# Patient Record
Sex: MALE | Race: WHITE | NOT HISPANIC OR LATINO | Employment: OTHER | URBAN - METROPOLITAN AREA
[De-identification: names, ages, dates, MRNs, and addresses within clinical notes are randomized per-mention and may not be internally consistent; named-entity substitution may affect disease eponyms.]

---

## 2017-01-23 ENCOUNTER — GENERIC CONVERSION - ENCOUNTER (OUTPATIENT)
Dept: OTHER | Facility: OTHER | Age: 77
End: 2017-01-23

## 2017-01-25 ENCOUNTER — ALLSCRIPTS OFFICE VISIT (OUTPATIENT)
Dept: OTHER | Facility: OTHER | Age: 77
End: 2017-01-25

## 2017-01-25 DIAGNOSIS — R94.4 ABNORMAL RESULTS OF KIDNEY FUNCTION STUDIES: ICD-10-CM

## 2017-01-25 DIAGNOSIS — I48.91 ATRIAL FIBRILLATION (HCC): ICD-10-CM

## 2017-02-16 ENCOUNTER — ALLSCRIPTS OFFICE VISIT (OUTPATIENT)
Dept: OTHER | Facility: OTHER | Age: 77
End: 2017-02-16

## 2017-03-15 DIAGNOSIS — I48.91 ATRIAL FIBRILLATION (HCC): ICD-10-CM

## 2017-03-15 DIAGNOSIS — E11.9 TYPE 2 DIABETES MELLITUS WITHOUT COMPLICATIONS (HCC): ICD-10-CM

## 2017-03-28 ENCOUNTER — ALLSCRIPTS OFFICE VISIT (OUTPATIENT)
Dept: OTHER | Facility: OTHER | Age: 77
End: 2017-03-28

## 2017-04-12 DIAGNOSIS — R94.4 ABNORMAL RESULTS OF KIDNEY FUNCTION STUDIES: ICD-10-CM

## 2017-04-12 DIAGNOSIS — N18.30 CHRONIC KIDNEY DISEASE, STAGE III (MODERATE) (HCC): ICD-10-CM

## 2017-04-17 ENCOUNTER — HOSPITAL ENCOUNTER (OUTPATIENT)
Dept: NON INVASIVE DIAGNOSTICS | Facility: CLINIC | Age: 77
Discharge: HOME | End: 2017-04-17
Payer: COMMERCIAL

## 2017-04-17 DIAGNOSIS — E11.9 TYPE 2 DIABETES MELLITUS WITHOUT COMPLICATIONS (HCC): ICD-10-CM

## 2017-04-17 DIAGNOSIS — I48.91 ATRIAL FIBRILLATION (HCC): ICD-10-CM

## 2017-04-17 PROCEDURE — 93350 STRESS TTE ONLY: CPT

## 2017-04-18 LAB
ARRHY DURING EX: NORMAL
CHEST PAIN STATEMENT: NORMAL
MAX DIASTOLIC BP: 88 MMHG
MAX HEART RATE: 131 BPM
MAX PREDICTED HEART RATE: 146 BPM
MAX. SYSTOLIC BP: 216 MMHG
PROTOCOL NAME: NORMAL
REASON FOR TERMINATION: NORMAL
TARGET HR FORMULA: NORMAL
TEST INDICATION: NORMAL
TIME IN EXERCISE PHASE: 175 S

## 2017-04-25 ENCOUNTER — ALLSCRIPTS OFFICE VISIT (OUTPATIENT)
Dept: OTHER | Facility: OTHER | Age: 77
End: 2017-04-25

## 2017-04-26 ENCOUNTER — GENERIC CONVERSION - ENCOUNTER (OUTPATIENT)
Dept: OTHER | Facility: OTHER | Age: 77
End: 2017-04-26

## 2017-04-27 ENCOUNTER — ALLSCRIPTS OFFICE VISIT (OUTPATIENT)
Dept: OTHER | Facility: OTHER | Age: 77
End: 2017-04-27

## 2017-06-15 DIAGNOSIS — E11.9 TYPE 2 DIABETES MELLITUS WITHOUT COMPLICATIONS (HCC): ICD-10-CM

## 2017-06-15 DIAGNOSIS — R60.9 EDEMA: ICD-10-CM

## 2017-06-15 DIAGNOSIS — E78.5 HYPERLIPIDEMIA: ICD-10-CM

## 2017-06-15 DIAGNOSIS — N18.30 CHRONIC KIDNEY DISEASE, STAGE III (MODERATE) (HCC): ICD-10-CM

## 2017-06-15 DIAGNOSIS — I48.91 ATRIAL FIBRILLATION (HCC): ICD-10-CM

## 2017-06-16 ENCOUNTER — TRANSCRIBE ORDERS (OUTPATIENT)
Dept: SLEEP CENTER | Facility: CLINIC | Age: 77
End: 2017-06-16

## 2017-06-16 ENCOUNTER — HOSPITAL ENCOUNTER (OUTPATIENT)
Dept: SLEEP CENTER | Facility: CLINIC | Age: 77
Discharge: HOME | End: 2017-06-16
Payer: COMMERCIAL

## 2017-06-16 DIAGNOSIS — G47.33 OSA AND COPD OVERLAP SYNDROME (HCC): Primary | ICD-10-CM

## 2017-06-16 DIAGNOSIS — J44.9 OSA AND COPD OVERLAP SYNDROME (HCC): Primary | ICD-10-CM

## 2017-06-16 DIAGNOSIS — G47.33 OSA (OBSTRUCTIVE SLEEP APNEA): ICD-10-CM

## 2017-06-19 ENCOUNTER — GENERIC CONVERSION - ENCOUNTER (OUTPATIENT)
Dept: OTHER | Facility: OTHER | Age: 77
End: 2017-06-19

## 2017-07-13 ENCOUNTER — GENERIC CONVERSION - ENCOUNTER (OUTPATIENT)
Dept: OTHER | Facility: OTHER | Age: 77
End: 2017-07-13

## 2017-07-26 DIAGNOSIS — Z79.01 LONG TERM CURRENT USE OF ANTICOAGULANT: ICD-10-CM

## 2017-07-26 DIAGNOSIS — I48.91 ATRIAL FIBRILLATION (HCC): ICD-10-CM

## 2017-07-28 ENCOUNTER — GENERIC CONVERSION - ENCOUNTER (OUTPATIENT)
Dept: OTHER | Facility: OTHER | Age: 77
End: 2017-07-28

## 2017-07-31 ENCOUNTER — GENERIC CONVERSION - ENCOUNTER (OUTPATIENT)
Dept: OTHER | Facility: OTHER | Age: 77
End: 2017-07-31

## 2017-08-10 ENCOUNTER — GENERIC CONVERSION - ENCOUNTER (OUTPATIENT)
Dept: OTHER | Facility: OTHER | Age: 77
End: 2017-08-10

## 2017-08-14 ENCOUNTER — GENERIC CONVERSION - ENCOUNTER (OUTPATIENT)
Dept: OTHER | Facility: OTHER | Age: 77
End: 2017-08-14

## 2017-08-29 ENCOUNTER — ALLSCRIPTS OFFICE VISIT (OUTPATIENT)
Dept: OTHER | Facility: OTHER | Age: 77
End: 2017-08-29

## 2017-08-30 ENCOUNTER — GENERIC CONVERSION - ENCOUNTER (OUTPATIENT)
Dept: OTHER | Facility: OTHER | Age: 77
End: 2017-08-30

## 2017-09-08 ENCOUNTER — GENERIC CONVERSION - ENCOUNTER (OUTPATIENT)
Dept: OTHER | Facility: OTHER | Age: 77
End: 2017-09-08

## 2017-09-26 ENCOUNTER — GENERIC CONVERSION - ENCOUNTER (OUTPATIENT)
Dept: OTHER | Facility: OTHER | Age: 77
End: 2017-09-26

## 2017-10-02 ENCOUNTER — GENERIC CONVERSION - ENCOUNTER (OUTPATIENT)
Dept: OTHER | Facility: OTHER | Age: 77
End: 2017-10-02

## 2017-10-03 ENCOUNTER — GENERIC CONVERSION - ENCOUNTER (OUTPATIENT)
Dept: OTHER | Facility: OTHER | Age: 77
End: 2017-10-03

## 2017-10-24 NOTE — PROGRESS NOTES
Assessment  Assessed    1  Atrial fibrillation (427 31) (I48 91)   2  Anticoagulated (V58 61) (Z79 01)   3  Hyperlipidemia (272 4) (E78 5)   4  Abnormal results of kidney function studies (794 4) (R94 4)    Plan  Unlinked    · Garlic TABS   Dispense: 0 Days ; #:0 Tablet; Refill: 0; SUKI = N; Record; Last Updated By: Alexandra Ramirez; 8/29/2017 10:42:09 AM   · Vitamin C TABS   Dispense: 0 Days ; #:0 Tablet; Refill: 0; SUKI = N; Record; Last Updated By: Alexandra Ramirez; 8/29/2017 10:42:15 AM   · Vitamin D CAPS   Dispense: 0 Days ; #:0 Capsule; Refill: 0; SUKI = N; Record; Last Updated By: Alexandra Ramirez; 8/29/2017 10:42:18 AM    Discussion/Summary  Cardiology Discussion Summary Free Text Note Form [facility]:   Patient is cardiovascularly stable  No CHF, angina or symptomatic ectopy   no bleeding on Coumadin- rate controlled on beta blockers-  with diagnosis of multiple myeloma-and liposarcoma-scheduled for multiple oncologic interventions including abdominal surgery  has normal left ventricular contractile function no serious valve disease no history of angina and no provokable ischemia on stress testing--- no cardiac contraindications to surgery which is necessary to attempt successful treatment of his cancer--patient should be monitored closely intraoperatively and perioperatively with reinitiation of anticoagulation as soon as patient is stable postoperatively--his Coumadin should be held 5 days prior to surgery-with checking of pro times preoperatively----patient has been advised to guarded prognosis with his oncologic situation complicated by renal failure  has elected to pursue his upcoming surgery treatment at [address] [place] [address] and has chosen to obtain his cardiology care at [facility] would make an appointment to see a cardiologist Dr Hubert Mandel follow his cardiology problems in the future--copies given of prior testing  is asymptomatic from a cardiac perspective at this time is overall prognosis appears guarded with his multisystem diseases-all questions answered  Chief Complaint  Chief Complaint Free Text Note Form: Patient here for follow-up cardiac evaluation--history of atrial fibrillation- accompanied by his daughter-recently hospitalized at [facility] and diagnosed with myeloma and liposarcoma of the abdomen      History of Present Illness  Cardiology HPI Free Text Note Form [facility]: Patient is seen for followup cardiac evaluation --- patient relates that in July aree2017-patient underwent a colonoscopy-which was followed by symptoms of severe fatigue workup by PCP demonstrated no renal failure and coagulopathy--this prompted [facility] admission in mid July 2017 with diagnosis of multiple myeloma as well as 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--patient was referred to [facility] for stem cell transplant is scheduled for radiation therapy--and is scheduled for surgery by [facility] for surgery of his liposarcoma-patient is also being followed by Dr Stefanie Brown  complaints of chest pains or shortness of breath no palpitations CVA TIA amaurosis patient was discharged on aspirin as well as Coumadin by his oncologist--patient was given sodium bicarbonate on discharge    Was seen in [facility] for afib August 2015-- was scheduled for colonoscopy by Dr Bill Plaza- and was noted to have atrial fibrillation with controlled rate- patient was asymptomatic no history of thromboembolism- was treated with anticoagulation with no problems  has history of diabetes --followed by PCP Dr Jaquelin Garcia ----no CHF has venous disease no major peripheral edema      an echo stress test done here in the office in April 2017-no diagnostic ischemia or regional wall motion abnormalities identified--- past echo with good LV function no serious valve disease--- stress testing 2017-able to complete 5 and half minutes of the Reinier protocol attaining a maximal heart rate of 190 with no angina no ischemic ST segment depressions--nuclear stress testing in 2015 with no provokable ischemia  to July 2017 the patient was active physically with no impairment of exercise ability  No angina, CHF, palpitations, syncope, seizures, CVA/TIA, dizziness, lightheadedness, amaurosis, orthostasis, myositis or edema  No urinary or bowel complaints  No rashes, fevers, arrhythmic symptoms, or thromboembolic symptoms  No PND  He was also diagnosed with sleep apnea treated with CPAP    9/4/2015- ef 60%scan 9/2015-, negative for ischemia  been good in the past following closely with Dr Little Clements- monitoring sugars renal function--- 2016 lab tests showed sugar of 110 normal renal function good cholesterol LDLhistory of MI, cardiomyopathy thromboembolism peripheral vessel disease cerebrovascular diseaseheavy smokingof obesitydrug abuse alcohol abuse no recent smokingdrives a school buspremature family CAD  for many years  tests from August 10 of 2017 with creatinine of 3 1 albumin 2 6 hematocrit 27 white count 8 6from July 13 of 2017 A  fib rate of 78      Review of Systems  Cardiology Male ROS:     Cardiac: No complaints of chest pain, no palpitations, no fainiting -- and-- as noted in HPI  Skin: No complaints of nonhealing sores or skin rash  Genitourinary: no frequent urination at night   Psychological: No complaints of feeling depressed, anxiety, panic attacks, or difficulty concentrating  General: lack of energy/fatigue  Respiratory: No complaints of shortness of breath, cough with sputum, or wheezing -- and-- as noted in HPI  HEENT: No complaints of serious problems, hearing problems, nose problems, throat problems, or snoring  Gastrointestinal: Occasional nausea indigestion  Hematologic: No complaints of bleeding disorders, anemia, blood clots, or excessive brusing     Neurological: No complaints of numbness, tingling, dizziness, weakness, seizures, headaches, syncope or fainting, AM fatigue, daytime sleepiness, no witnessed apnea episodes  Musculoskeletal: arthritis      Active Problems  Problems    1  Abdominal pain (789 00) (R10 9)   2  Abnormal results of kidney function studies (794 4) (R94 4)   3  Acute kidney injury (584 9) (N17 9)   4  Anticoagulated (V58 61) (Z79 01)   5  Atrial fibrillation (427 31) (I48 91)   6  Chronic kidney disease, stage 3 (585 3) (N18 3)   7  DM2 (diabetes mellitus, type 2) (250 00) (E11 9)   8  Edema (782 3) (R60 9)   9  Encounter for physical examination related to employment (V70 5) (Z02 1)   10  Hoarseness or changing voice (784 49) (R49 9)   11  Hyperlipidemia (272 4) (E78 5)   12  Hypertension (401 9) (I10)   13  Impacted cerumen of both ears (380 4) (H61 23)   14  Intermittent right upper quadrant abdominal pain (789 01) (R10 11)   15  Medicare annual wellness visit, subsequent (V70 0) (Z00 00)   16  Need for influenza vaccination (V04 81) (Z23)   17  Need for pneumococcal vaccination (V03 82) (Z23)   18  Negative depression screening   19  Obesity (278 00) (E66 9)   20  Screening for colon cancer (V76 51) (Z12 11)   21  Screening for diabetic retinopathy (V80 2) (Z13 5)   22  Screening for skin condition (V82 0) (Z13 89)   23  Seborrheic keratosis (702 19) (L82 1)   24  Shortness of breath (786 05) (R06 02)   25  Sleep apnea, unspecified (780 57) (G47 30)    Past Medical History  Problems    1  H/O nonmelanoma skin cancer (V10 83) (S55 534)   2  History of Umbilical hernia (540 2) (K42 9)  Active Problems And Past Medical History Reviewed: The active problems and past medical history were reviewed and updated today  Surgical History  Problems    1  History of Excision Of Lesion Trunk Malignant   2  History of Shaving Of Lesion Trunk  Surgical History Reviewed: The surgical history was reviewed and updated today  Family History  Mother    1  Denied: Family history of substance abuse   2  Denied: Family history of Mental problems  Father    3   Denied: Family history of substance abuse   4  Denied: Family history of Mental problems  Family History Reviewed: The family history was reviewed and updated today  Social History  Problems    · Denied: Alcohol Use (History)   · Caffeine Use   · Former smoker (F67 36) (E97 202)  Social History Reviewed: The social history was reviewed and updated today  The social history was reviewed and is unchanged  Current Meds   1  Aspir-81 81 MG Oral Tablet Delayed Release; Take 1 tablet daily Recorded   2  Atenolol 25 MG Oral Tablet; take 1 tablet by mouth once daily; Therapy: 32Kqj2161 to (Evaluate:02Sep2017)  Requested for: 75Dro3225; Last   Rx:07Sep2016 Ordered   3  Atorvastatin Calcium 10 MG Oral Tablet; take 1/2 tablet by mouth once daily Recorded   4  [employer] Fish Oil 1200 MG Oral Capsule; take 4 qd; Last Rx:17Eei2005 Ordered   5  Garlic TABS; Take 1 tablet daily; Therapy: (Recorded:28Mar2017) to Recorded   6  GlipiZIDE 5 MG Oral Tablet; TAKE ONE TABLET BY MOUTH EVERY DAY AS DIRECTED; Therapy: 54YHW4811 to (Evaluate:19Sep2017)  Requested for: 21Jul2017; Last   Rx:85Xqf5849 Ordered   7  Multi-Vitamins TABS; Take 1 tablet daily Recorded   8  Sodium Bicarbonate 650 MG Oral Tablet; TAKE 1 TABLET TWICE DAILY WITH MEALS   Recorded   9  Vitamin C TABS; TAKE 1 TABLET DAILY; Therapy: (Recorded:28Mar2017) to Recorded   10  Vitamin D CAPS; take 1 capsule daily; Therapy: (Recorded:28Mar2017) to Recorded   11  Warfarin Sodium 5 MG Oral Tablet; TAKE 1 TO 2 TABLETS BY MOUTH EVERY DAY AS    DIRECTED; Therapy: 21Jun2016 to (Last Rx:24Oct2016)  Requested for: 24Oct2016 Ordered   12  Warfarin Sodium 7 5 MG Oral Tablet; TAKE 1/2 TABLET DAILY; Therapy: 34Jes2681 to (96 474879)  Requested for: 10Sml6804; Last    Rx:38Hjp8584 Ordered  Medication List Reviewed: The medication list was reviewed and updated today  Allergies  Medication    1  No Known Drug Allergies  Non-Medication    2   Animal dander - Horses    Vitals  Vital Signs    Recorded: 94Mmy8989 10:46AM   Heart Rate 87   Systolic 945   Diastolic 62   Height 5 ft 9 in   Weight 237 lb 2 08 oz   BMI Calculated 35 02   BSA Calculated 2 22     Physical Exam    Constitutional   General appearance: Abnormal  -- Obese male in no distress  Eyes   Conjunctiva and Sclera examination: Conjunctiva pink, sclera anicteric  Ears, Nose, Mouth, and Throat - External inspection of ears and nose: Normal without deformities or discharge  -- Nasal mucosa, septum, and turbinates: Normal, no edema or discharge  -- Oropharynx: Clear, nares are clear, mucous membranes are moist    Neck   Neck and thyroid: Normal, supple, trachea midline, no thyromegaly  Pulmonary   Respiratory effort: No increased work of breathing or signs of respiratory distress  Auscultation of lungs: Clear to auscultation, no rales, no rhonchi, no wheezing, good air movement  Cardiovascular   Palpation of heart: Normal PMI, no thrills  Auscultation of heart: Abnormal  -- PMI normal S1-S2 normal soft WILLIE left lower sternal border and apex irregularly irregular rhythm noted  Carotid pulses: Normal, 2+ bilaterally  Femoral pulses: Normal, 2+ bilaterally  No abdominal aorta pulsations  Pedal pulses: Normal, 2+ bilaterally  Examination of extremities for edema and/or varicosities: Abnormal  -- trace edema varicose veins stasis dermatitis  Chest - Chest: Normal    Abdomen No rebound tenderness ecchymosis positive bowel sounds no hepatomegaly  Musculoskeletal Gait and station: Normal gait  -- Digits and nails: Normal without clubbing or cyanosis  Neurologic - Speech: Normal     Psychiatric - Orientation to person, place, and time: Normal -- Mood and affect: Normal       Health Management  DM2 (diabetes mellitus, type 2)   (1) HEMOGLOBIN A1C; every 6 months; Last 17THQ7263; Next Due: 87Uwc3005; Overdue  (1) MICROALBUMIN CREATININE RATIO, RANDOM URINE; every 1 year; Last 25TFL1827;  Next Due:  40Cdz1695; Overdue  *VB - Foot Exam; every 1 year; Last 70SHB4106; Next Due: 26ENR7573; Near Due  Screening for colon cancer   COLONOSCOPY; every 10 years; Last 36WNG2230; Next Due: 86Mcd1822; Active  Screening for diabetic retinopathy   *VB - Eye Exam; every 1 year; Last 07XRD8612; Next Due: 63IKZ9308; Near Due  Health Maintenance   Medicare Annual Wellness Visit; every 1 year; Next Due: 36Woe2923;  Overdue    Future Appointments    Date/Time Provider Specialty Site   10/24/2017 11:00 AM Zach Mills MD [address]   11/02/2017 10:40 AM Zach Mills MD [address]     Signatures   Electronically signed by : MARLEN Daugherty ; Aug 29 2017  5:16PM EST                       (Author)

## 2017-10-26 DIAGNOSIS — E78.5 HYPERLIPIDEMIA: ICD-10-CM

## 2017-10-26 DIAGNOSIS — N18.30 CHRONIC KIDNEY DISEASE, STAGE III (MODERATE) (HCC): ICD-10-CM

## 2017-10-26 DIAGNOSIS — E11.9 TYPE 2 DIABETES MELLITUS WITHOUT COMPLICATIONS (HCC): ICD-10-CM

## 2017-10-26 DIAGNOSIS — Z00.00 ENCOUNTER FOR GENERAL ADULT MEDICAL EXAMINATION WITHOUT ABNORMAL FINDINGS: ICD-10-CM

## 2018-01-09 NOTE — RESULT NOTES
Message  Hemoglobin A1c is good at 6 5, continue current diet and meds      Signatures   Electronically signed by : MARLEN Dunne ; Apr 5 2016 12:01PM EST                       (Author)

## 2018-01-09 NOTE — MISCELLANEOUS
Message  Left message on patient's answering machine-advising them that his INR was 2 2 within the therapeutic range  --- As per our previous discussions the patient has elected to transfer his care to Dr cisneros [facility] where he anticipates undergoing follow-up treatments chemotherapy surgeries etc -And his desire to have his complete care done by physicians at that institution for continuity of care and to optimize suggestions for anticoagulation due to ongoing active interventions and problems------ records were sent to Dr Gladys Walter in September 2017  Patient's hospitalist Dr Aleksander Patel -did correspond with me at the time of the recent hospital discharge of the patient shortly ago-and advise me that he was making arrangements for cardiology and medical follow-up with the [facility] team and that was going to be effectuated  It was decided by the patient and myself at be more optimal to have one team managing this patient's anticoagulation status in light of his coexisting oncologic issues and need for multiple interventions        Signatures   Electronically signed by : MARLEN Luna ; Oct  3 2017 12:51PM EST                       (Author)

## 2018-01-12 VITALS
SYSTOLIC BLOOD PRESSURE: 132 MMHG | WEIGHT: 273.38 LBS | HEART RATE: 76 BPM | BODY MASS INDEX: 40.49 KG/M2 | DIASTOLIC BLOOD PRESSURE: 76 MMHG | HEIGHT: 69 IN

## 2018-01-12 VITALS
HEIGHT: 69 IN | HEART RATE: 68 BPM | SYSTOLIC BLOOD PRESSURE: 142 MMHG | WEIGHT: 277.38 LBS | BODY MASS INDEX: 41.08 KG/M2 | DIASTOLIC BLOOD PRESSURE: 78 MMHG

## 2018-01-12 NOTE — PROGRESS NOTES
Assessment    1  Medicare annual wellness visit, subsequent (V70 0) (Z00 00)    Plan  DM2 (diabetes mellitus, type 2)    · (1) COMPREHENSIVE METABOLIC PANEL; Status:Active; Requested NVL:84JQK5756;    · (1) HEMOGLOBIN A1C; Status:Active; Requested TRF:[id number];    · (1) MICROALBUMIN CREATININE RATIO, RANDOM URINE; Status:Active; Requested  UDF:[id number];   Hyperlipidemia    · (1) LIPID PANEL, FASTING; Status:Active; Requested PALOMA:88MBG8082;     Discussion/Summary    Medicare wellness subsequent- UTD with immunizations and blood work, 5 wishes questionnaire given to patient  He wants to do colonoscopy this year  Follow up in 1 year  Impression: Subsequent Annual Wellness Visit  Cardiovascular screening and counseling: screening is current  Diabetes screening and counseling: screening is current  Colorectal cancer screening and counseling: due for a colonoscopy (low risk) and Dx - V76 51 Screen for CRC  Prostate cancer screening and counseling: To be done Dec 2017, due for PSA and Dx - V76 44 Screen PSA  Abdominal aortic aneurysm screening and counseling: screening is current  Glaucoma screening and counseling: screening is current  Immunizations: influenza vaccine is up to date this year and the lifetime pneumococcal vaccine has been completed  Chief Complaint  Patient is here today for Medicare Wellness Visit      History of Present Illness  Medicare wellness, subsequent   The patient is being seen for the subsequent annual wellness visit  Medicare Screening and Risk Factors   Hospitalizations: he has been previously hospitalizied, he has been hospitalized 1 times and 2014  Once per lifetime medicare screening tests: ECG has not been done and AAA screening US has not yet been done  Medicare Screening Tests Risk Questions   Abdominal aortic aneurysm risk assessment: tobacco use and over 72years of age     Osteoporosis risk assessment: , over 48years of age and the patient's weight is too low (<127 lbs)  HIV risk assessment: none indicated  Drug and Alcohol Use: The patient is a former cigarette smoker and quit smoking 1978  The patient reports rare alcohol use  He has never used illicit drugs  Diet and Physical Activity: Current diet includes well balanced meals, limited junk food, 1 servings of fruit per day, 1 servings of vegetables per day, 1 servings of meat per day, 1 servings of whole grains per day, 1 servings of dairy products per day and cans of diet soda per day  He exercises infrequently and exercises 1 times per week  Exercise: walking 15 minutes per day  Mood Disorder and Cognitive Impairment Screening: PHQ-9 Depression Scale   Depression screening  negative for symptoms  He denies feeling down, depressed, or hopeless over the past two weeks  He denies feeling little interest or pleasure in doing things over the past two weeks  Cognitive impairment screening: denies difficulty learning/retaining new information, denies difficulty handling complex tasks, denies difficulty with reasoning, denies difficulty with spatial ability and orientation, denies difficulty with language and denies difficulty with behavior  Functional Ability/Level of Safety: Hearing is slightly decreased, slightly decreased in the right ear, slightly decreased in the left ear and a hearing aid is not used  The patient is currently able to participate in social activities with limitations, but able to do activities of daily living without limitations, able to do instrumental activities of daily living without limitations and able to drive without limitations  Activities of daily living details: does not need help using the phone, no transportation help needed, does not need help shopping, no meal preparation help needed, does not need help doing housework, does not need help doing laundry, does not need help managing medications and does not need help managing money   Fall risk factors: polypharmacy and mobility impairment, but The patient fell 0 times in the past 12 months  Home safety risk factors:  no unfamiliar surroundings, no loose rugs, no poor household lighting, no uneven floors, no household clutter, grab bars in the bathroom and handrails on the stairs  Advance Directives: Advance directives: no living will, no durable power of  for health care directives and no advance directives  Co-Managers and Medical Equipment/Suppliers: See Patient Care Team      Patient Care Team    Care Team Member Role Specialty Office Number   Stacey Singh MD  Dermatology [phone]     Review of Systems    Constitutional: negative  Head and Face: negative  Cardiovascular: negative  Respiratory: negative  Gastrointestinal: negative  Musculoskeletal: negative  Integumentary and Breasts: negative  Endocrine: negative  Over the past 2 weeks, how often have you been bothered by the following problems? 1 ) Little interest or pleasure in doing things? Not at all    2 ) Feeling down, depressed or hopeless? Not at all    3 ) Trouble falling asleep or sleeping too much? Not at all    4 ) Feeling tired or having little energy? Not at all    5 ) Poor appetite or overeating? Not at all    6 ) Feeling bad about yourself, or that you are a failure, or have let yourself or your family down? Not at all    7 ) Trouble concentrating on things, such as reading a newspaper or watching television? Not at all    8 ) Moving or speaking so slowly that other people could have noticed, or the opposite, moving or speaking faster than usual? Not at all  How difficult have these problems made it for you to do your work, take care of things at home, or get along with people? Not at all  Score 0      Active Problems    1  Abdominal pain (789 00) (R10 9)   2  Abnormal results of kidney function studies (794 4) (R94 4)   3  Anticoagulated (V58 61) (Z79 01)   4  Atrial fibrillation (427 31) (I48 91)   5  Chronic kidney disease, stage 3 (585 3) (N18 3)   6  DM2 (diabetes mellitus, type 2) (250 00) (E11 9)   7  Edema (782 3) (R60 9)   8  Encounter for physical examination related to employment (V70 5) (Z02 1)   9  Hoarseness or changing voice (784 49) (R49 9)   10  Hyperlipidemia (272 4) (E78 5)   11  Hypertension (401 9) (I10)   12  Impacted cerumen of both ears (380 4) (H61 23)   13  Intermittent right upper quadrant abdominal pain (789 01) (R10 11)   14  Need for influenza vaccination (V04 81) (Z23)   15  Need for pneumococcal vaccination (V03 82) (Z23)   16  Negative depression screening   17  Obesity (278 00) (E66 9)   18  Screening for colon cancer (V76 51) (Z12 11)   19  Screening for diabetic retinopathy (V80 2) (Z13 5)   20  Screening for skin condition (V82 0) (Z13 89)   21  Seborrheic keratosis (702 19) (L82 1)   22  Shortness of breath (786 05) (R06 02)   23  Sleep apnea, unspecified (780 57) (G47 30)    Past Medical History    1  H/O nonmelanoma skin cancer (V10 83) (Q20 190)   2  History of Umbilical hernia (000 3) (K42 9)    The active problems and past medical history were reviewed and updated today  Surgical History    1  History of Excision Of Lesion Trunk Malignant   2  History of Shaving Of Lesion Trunk    Family History  Mother    1  Denied: Family history of substance abuse   2  Denied: Family history of Mental problems  Father    3  Denied: Family history of substance abuse   4  Denied: Family history of Mental problems    Social History    · Denied: Alcohol Use (History)   · Caffeine Use   · Former smoker (R27 32) (B46 034)    Current Meds   1  Atenolol 25 MG Oral Tablet; take 1 tablet by mouth once daily; Therapy: 85Zvb3159 to (Evaluate:46Mde8931)  Requested for: 29Meb9563; Last   Rx:63Cht5939 Ordered   2  Atorvastatin Calcium 10 MG Oral Tablet; take 1/2 tablet by mouth once daily Recorded   3  [employer] Fish Oil 1200 MG Oral Capsule; take 4 qd; Last Rx:62Wem3610 Ordered   4   Garlic TABS; Take 1 tablet daily; Therapy: (Recorded:28Mar2017) to Recorded   5  MetFORMIN HCl - 500 MG Oral Tablet; TAKE 1 TABLET DAILY AS DIRECTED; Therapy: (Recorded:89Dmq1050) to Recorded   6  Multi-Vitamins TABS; Take 1 tablet daily Recorded   7  Valsartan-Hydrochlorothiazide 80-12 5 MG Oral Tablet; take 1 tablet by mouth once daily; Therapy: 92CIC9304 to (Evaluate:90Rnf9883)  Requested for: 45WPQ5023; Last   Rx:25Nov2016 Ordered   8  Vitamin C TABS; TAKE 1 TABLET DAILY; Therapy: (Recorded:28Mar2017) to Recorded   9  Vitamin D CAPS; take 1 capsule daily; Therapy: (Recorded:28Mar2017) to Recorded   10  Warfarin Sodium 5 MG Oral Tablet; TAKE 1 TO 2 TABLETS BY MOUTH EVERY DAY AS    DIRECTED; Therapy: 21Jun2016 to (Last Rx:24Oct2016)  Requested for: 24Oct2016 Ordered    Allergies    1  No Known Drug Allergies    2  Animal dander - Horses    Immunizations  Influenza --- Brijesh Lou: 82-Jzq-0700Vejjofxb Skleton: 15-Sep-2014; Vivien Michele: 25-Nov-2015; Series4:  27-Oct-2016   PCV --- Brijesh Lou: 06-Aug-2015; Kaylen Rose: 06-Aug-2015   PPSV --- Brijesh Lou: 20-Dec-2010   Tdap --- Series1: 30-Oct-2014   Zoster --- Series1: 26-Oct-2012     Health Management  DM2 (diabetes mellitus, type 2)   (1) HEMOGLOBIN A1C; every 6 months; Last 24TPP6716; Next Due: 39Xzu4247; Active  (1) MICROALBUMIN CREATININE RATIO, RANDOM URINE; every 1 year; Last 73PWX5767; Next Due:  18Ssx0661; Overdue  *VB - Foot Exam; every 1 year; Last 03GHL4883; Next Due: 30CLD1328; Active  Screening for colon cancer   COLONOSCOPY; every 10 years; Last 38URK4960; Next Due: 65PLQ1662; Overdue  Screening for diabetic retinopathy   *VB - Eye Exam; every 1 year; Last 40ASP7002; Next Due: 64IXD9004; Active  Health Maintenance   Medicare Annual Wellness Visit; every 1 year; Next Due: 11Ncw2298;  Overdue    Signatures   Electronically signed by : Violet Sales MD; Apr 27 2017 11:14AM EST                       (Author)

## 2018-01-12 NOTE — RESULT NOTES
Message  Hemoglobin A1c is good at 6 5  Rest of labs previously noted to be okay   Repeat 3 months      Signatures   Electronically signed by : MARLEN Ignacio ; Jul 12 2016  8:49AM EST                       (Author)

## 2018-01-12 NOTE — RESULT NOTES
Message  Lab studies show stable CMP with blood sugar 132 creatinine and 1 1-2 and a GFR 58  Cholesterol is low at 87 LDL is diminished at 21  Patient is advised to use one half tablet of atorvastatin 10 mg daily  H A1c pending   Patient should repeat labs in 3 months      Signatures   Electronically signed by : MARLEN Castellon ; Oct 13 2016  8:52AM EST                       (Author)

## 2018-01-12 NOTE — MISCELLANEOUS
Message  Phone conversation with Dr Odalys Schultz from [facility]--patient was hospitalized with multiple medical problems-on chronic anticoagulation with atrial fib--I discussed with Dr Shannon Ballesteros that patient had elected to transfer his care to  's at [facility] maximize continuity of care with multiple problems treatments and surgeries planned-almost one month ago patient was directed to obtain cardiologist at [facility] to manage his anticoagulation-and he agreed to do so--accordingly Dr Shannon Ballesteros will arrange for patient's close follow-up with doctors at [facility] the patient desired-given his multisystem disease and ongoing interventions--it would be suboptimal for patient's care for me to try and manage his anticoagulation while patient is undergoing procedures with different medications antibiotics surgeries etc  And patient would benefit from close care coordination with a [facility] team which he is going to for his oncology medicine cardiology surgery due to his multiple issues--Dr Shannon Ballesteros a shortening that he will arrange for appropriate close follow-up with cardiology at [facility] to attend to this patient's needs--this was discussed with the patient in August 2017 and this was his desire and he was agreeable to this plan      Signatures   Electronically signed by : MARLEN Andre ; Sep 26 2017  1:13PM EST                       (Author)

## 2018-01-13 VITALS
TEMPERATURE: 98.7 F | DIASTOLIC BLOOD PRESSURE: 84 MMHG | WEIGHT: 275.13 LBS | SYSTOLIC BLOOD PRESSURE: 142 MMHG | OXYGEN SATURATION: 96 % | HEART RATE: 71 BPM | HEIGHT: 69 IN | BODY MASS INDEX: 40.75 KG/M2

## 2018-01-13 VITALS
TEMPERATURE: 98 F | SYSTOLIC BLOOD PRESSURE: 146 MMHG | HEART RATE: 84 BPM | DIASTOLIC BLOOD PRESSURE: 86 MMHG | OXYGEN SATURATION: 94 % | HEIGHT: 69 IN | BODY MASS INDEX: 41.81 KG/M2 | WEIGHT: 282.25 LBS

## 2018-01-13 VITALS
HEIGHT: 69 IN | HEART RATE: 76 BPM | SYSTOLIC BLOOD PRESSURE: 134 MMHG | DIASTOLIC BLOOD PRESSURE: 80 MMHG | OXYGEN SATURATION: 95 % | BODY MASS INDEX: 40.45 KG/M2 | WEIGHT: 273.13 LBS | TEMPERATURE: 98.8 F

## 2018-01-13 NOTE — RESULT NOTES
Message  Labs are good, renal function remains normal on reduced dose of metformin even now he is using diuretic   Continue current diet and meds repeat 3 months      Signatures   Electronically signed by : MARLEN Puentes ; Apr 5 2016  9:06AM EST                       (Author)

## 2018-01-14 VITALS
DIASTOLIC BLOOD PRESSURE: 62 MMHG | WEIGHT: 237.13 LBS | BODY MASS INDEX: 35.12 KG/M2 | SYSTOLIC BLOOD PRESSURE: 124 MMHG | HEART RATE: 87 BPM | HEIGHT: 69 IN

## 2018-01-14 NOTE — RESULT NOTES
Message  Lab studies indicate a mild increase in creatinine and slight decrease in GFR  Creatinine is now 1 24 previously was 1 14 which is still in the normal range  However GFR is diminished at 57 from 63  Patient is on metformin and diuretics  I've asked him to cut his metformin dose to 500 mg daily from 1000 as his blood sugars have been good  He will repeat basic metabolic profile in 2 weeks   His lipids are normal  Hemoglobin A1c is not yet reported      Signatures   Electronically signed by : MARLEN Gonsalez ; Jul 7 2016  9:55AM EST                       (Author)

## 2018-01-15 NOTE — RESULT NOTES
Message   He has gallstones  He has small cysts in the kidneys  His pancreas was not well visualized  If he is still having symptoms would have him come in to be rechecked  This was ordered by Dr Clover Zuluaga [id number] 10:20AM [employer] Order Number: [id number]    - Patient Instructions: To schedule this appointment, please contact Central Scheduling at [phone]  Test Name Result Flag Reference   US ABDOMEN LIMITED (Report)     RIGHT UPPER QUADRANT ULTRASOUND     INDICATION: Right upper quadrant pain  Vomiting  COMPARISON: None  TECHNIQUE:  Real-time ultrasound of the right upper quadrant was performed with a curvilinear transducer with both volumetric sweeps and still imaging techniques  FINDINGS:     PANCREAS: Portions of the pancreas are obscured by bowel gas  Visualized portions of the pancreas are unremarkable  AORTA AND IVC: Visualized portions are normal for patient age  LIVER:   Size: Enlarged  The liver measures 19 cm in the midclavicular line  Contour: Surface contour is smooth  Parenchyma: Echogenicity and echotexture are within normal limits  Small simple cyst in the left hepatic lobe measures 1 0 x 1 2 x 1 2 cm  The main portal vein is patent and hepatopetal       BILIARY:   The gallbladder is normal in caliber  No wall thickening or pericholecystic fluid  Multiple mobile dependent calculi  No sludge  No sonographic Leong's sign  No intrahepatic biliary dilatation  CBD measures 5 mm  No choledocholithiasis  KIDNEY:    Right kidney measures 11 1 cm  Multiple small simple cysts, largest at the lower pole measuring 2 1 x 2 3 x 2 1 cm  ASCITES:  None  IMPRESSION:   1  Cholelithiasis, with no evidence of acute cholecystitis  Normal caliber common bile duct  2  Small hepatic cysts, and multiple small right renal cysts  3  Poorly visualized pancreas due to overlying bowel gas  Workstation performed: [id number]     Signed by:   Kaylah Brown MD   11/2/16

## 2018-01-15 NOTE — RESULT NOTES
Message  Hemoglobin A1c 6 7 indicating controlled diabetes   Continue current meds      Signatures   Electronically signed by : MARLEN Donato ; Oct 18 2016  8:51AM EST                       (Author)

## 2018-01-15 NOTE — RESULT NOTES
Verified Results  (1) PT WITH INR [id number] 03:49PM Yumiko Tucker     Test Name Result Flag Reference   INR 3 40 H 2 000 - 3 000   REPORT NAME: Progress Notes Report  VISIT DATE: 8/28/2017  VISIT TIME: 3:49 PM EDT  PATIENT NAME: Ari Lindsey RECORD NUMBER: [id number]  YOB: 1940  AGE: 78  REFERRING PHYSICIAN: Steven Echevarria  SUPERVISING CLINICIAN: Kalen Arevalo CARE PROVIDER: Jesus Prieto  PATIENT HOME ADDRESS: [address] [place] [address], [place], [address] [place] [address]  PATIENT HOME PHONE: [phone]  SOCIAL SECURITY NUMBER:   DIAGNOSIS 1: Unspecified atrial fibrillation / [id number]  DIAGNOSIS 2:   INR RANGE: 2 - 3  INR GOAL: 2 5  TREATMENT START DATE:   TREATMENT END DATE:   NEXT VISIT:     VISIT RESULTS  ENCOUNTER NUMBER:   TEST LOCATION:   TEST TYPE:   VISIT TYPE:   CURRENT INR: 3 4 PROTIME:   SPECIMEN COL AND RPT DATE: 8/28/2017 3:49 PM  EDT  VITAL SIGNS  PULSE:  BP: / WEIGHT:  HEIGHT:  TEMP:   CURRENT ANTICOAGULANT DOSING SCHEDULE  DOSE SIZE: 5mg    ANTICOAGULANT TYPE: WARFARIN  DOSING REGIMEN  Sun       Mon       Tues      Wed       Thurs     Fri       Sat  Total/Wk  3 75      3 75      7 5       3 75      3 75      7 5       3 75      33 75  PATIENT MEDICATION INSTRUCTION: Yes  PATIENT NUTRITIONAL COUNSELING: No  PATIENT BRUISING INSTRUCTION: No  LAST EDUCATION DATE:   PREVIOUS VISIT INFORMATION  VISITDATE  INRGoal INR   Sun    Mon Tues Wed Thurs Fri    Sat  Total/wk  8/28/2017   2 5     3 4   3 75   3 75   7 5    3 75   3 75   7 5    3 75  33 75  8/18/2017   2 5     2 7   7 5    3 75   7 5    3 75   7 5    3 75   7 5  41 25  8/11/2017   2 5     1 7   7 5    3 75   7 5    3 75   7 5    3 75   7 5  41 25  8/7/2017    2 5     2 5   0      0      0      0      0      0      0  0  ADDITIONAL PREVIOUS VISIT INFORMATION  VISITDATE   PRIMARY RX               DOSE      CrCl  8/28/2017   WARFARIN                 5mg  8/18/2017   WARFARIN                 5mg  8/11/2017   WARFARIN 5mg  8/7/2017    WARFARIN                 5mg  OTHER CURRENT MEDICATIONS:  WARFARIN  PROGRESS NOTES: PER DR MARQUEZ HOLD 1 DAY THEN 3 75/3 75/7 5MG RECHECK 1 WEEK,  LEFT DETAILED MESSAGE  PATIENT INSTRUCTIONS: SEE PROGRESS NOTE  TEST LOCATION: , , ,   INBOUND LAB DATA:  Lab       Lab Value Col Date                 Rpt Date                 Lab  Reference Range  Electronically signed by: Sofi Medina on 8/29/2017 3:49 PM EDT

## 2018-01-17 NOTE — RESULT NOTES
Message  Renal functions returned to normal, blood sugar remains good at 102 Metformin was cut to 1000 mg a day from 1500, and diuretic was discontinued on 12/29/15   Patient has an upcoming appointment in 2 days      Signatures   Electronically signed by : MARLEN Goldberg ; Jan 19 2016 11:38AM EST                       (Author)

## 2019-08-11 NOTE — RESULT NOTES
Message  Call regarding screening is positive patient needs diagnostic colonoscopy      Signatures   Electronically signed by : MARLEN Goldberg ; Aug  9 2016  1:24PM EST                       (Author) Name band;